# Patient Record
Sex: FEMALE | Race: WHITE | Employment: FULL TIME | URBAN - METROPOLITAN AREA
[De-identification: names, ages, dates, MRNs, and addresses within clinical notes are randomized per-mention and may not be internally consistent; named-entity substitution may affect disease eponyms.]

---

## 2019-02-19 NOTE — PERIOP NOTES
[address]                  
[address] [facility], [address] [facility] MAIN OR                                  [phone] MAIN PRE OP                          [phone]                                                                                AMBULATORY PRE OP          [phone] PRE-ADMISSION TESTING    21  Surgery Date: Wednesday, 2/27/19. Is surgery arrival time given by surgeon? NO [facility] will call you between 3 and 7pm the day before your surgery with your arrival time. (If your surgery is on a Monday, we will call you the Friday before.) Call [phone] after 7pm Monday-Friday if you did not receive your arrival time. Verification phone call on Tuesday, 2/26/19. INSTRUCTIONS BEFORE YOUR SURGERY When You 
Arrive Arrive at the 2nd [address] [facility] on the day of your surgery Have your insurance card, photo ID, and any copayment (if needed) Food 
 and  
Drink NO food or drink after midnight the night before surgery This means NO water, gum, mints, coffee, juice, etc. 
No alcohol (beer, wine, liquor) 24 hours before and after surgery Medications to TAKE Morning of Surgery MEDICATIONS TO TAKE THE MORNING OF SURGERY WITH A SIP OF WATER:  
? May Take allergy or Tylenol if needed morning of surgery with small sip of water. Medications To 
STOP      7 days before surgery ? Non-Steroidal anti-inflammatory Drugs (NSAID's): for example, Ibuprofen (Advil, Motrin), Naproxen (Aleve) ? Aspirin, if taking for pain ? Herbal supplements, vitamins, and fish oil 
? Other: 
(Pain medications not listed above, including Tylenol may be taken) Blood Thinners ? If you take  Aspirin, Plavix, Coumadin, or any blood-thinning or anti-blood clot medicine, talk to the doctor who prescribed the medications for pre-operative instructions. Bathing Clothing Jewelry Valuables ?  If you shower the morning of surgery, please do not apply anything to your skin (lotions, powders, deodorant, or makeup, especially mascara) ? Follow all special bath instructions (for total joint replacement, spine and bowel surgeries) ? Do not shave or trim anywhere 24 hours before surgery ? Wear your hair loose or down; no pony-tails, buns, or metal hair clips ? Wear loose, comfortable, clean clothes ? Wear glasses instead of contacts ? Leave money, valuables, and jewelry, including body piercings, at home Going Home       or Spending the Night ? SAME-DAY SURGERY: You must have a responsible adult drive you home and stay with you 24 hours after surgery ? ADMITS: If your doctor is keeping you into the hospital after surgery, leave personal belongings/luggage in your car until you have a hospital room number. Hospital discharge time is 12 noon Drivers must be here before 12 noon unless you are told differently Special Instructions Free  parking with No Tipping. Follow all instructions so your surgery wont be cancelled. Please, be on time. If a situation occurs and you are delayed the day of surgery, call [phone] or          [phone]. If your physical condition changes (like a fever, cold, flu, etc.) call your surgeon. The patient was contacted  via phone. Home medication reviewed and verified during PAT appointment. The patient verbalizes understanding of all instructions and does not  need reinforcement.

## 2019-02-26 ENCOUNTER — ANESTHESIA EVENT (OUTPATIENT)
Dept: SURGERY | Age: 24
End: 2019-02-26
Payer: SELF-PAY

## 2019-02-27 ENCOUNTER — HOSPITAL ENCOUNTER (OUTPATIENT)
Age: 24
Setting detail: OUTPATIENT SURGERY
Discharge: HOME | End: 2019-02-27
Attending: OTOLARYNGOLOGY | Admitting: OTOLARYNGOLOGY
Payer: SELF-PAY

## 2019-02-27 ENCOUNTER — ANESTHESIA (OUTPATIENT)
Dept: SURGERY | Age: 24
End: 2019-02-27
Payer: SELF-PAY

## 2019-02-27 VITALS
WEIGHT: 104.28 LBS | OXYGEN SATURATION: 100 % | SYSTOLIC BLOOD PRESSURE: 137 MMHG | BODY MASS INDEX: 17.8 KG/M2 | TEMPERATURE: 98.8 F | HEART RATE: 98 BPM | DIASTOLIC BLOOD PRESSURE: 87 MMHG | RESPIRATION RATE: 9 BRPM | HEIGHT: 64 IN

## 2019-02-27 DIAGNOSIS — Z98.890 H/O COSMETIC SURGERY: Primary | ICD-10-CM

## 2019-02-27 LAB — HCG UR QL: NEGATIVE

## 2019-02-27 PROCEDURE — 74011250636 HC RX REV CODE- 250/636: Performed by: OTOLARYNGOLOGY

## 2019-02-27 PROCEDURE — 77030002966 HC SUT PDS J&J -A: Performed by: OTOLARYNGOLOGY

## 2019-02-27 PROCEDURE — 74011000250 HC RX REV CODE- 250: Performed by: OTOLARYNGOLOGY

## 2019-02-27 PROCEDURE — 77030026438 HC STYL ET INTUB CARD -A: Performed by: NURSE ANESTHETIST, CERTIFIED REGISTERED

## 2019-02-27 PROCEDURE — 77030020782 HC GWN BAIR PAWS FLX 3M -B

## 2019-02-27 PROCEDURE — 74011250636 HC RX REV CODE- 250/636: Performed by: ANESTHESIOLOGY

## 2019-02-27 PROCEDURE — 77030008684 HC TU ET CUF COVD -B: Performed by: NURSE ANESTHETIST, CERTIFIED REGISTERED

## 2019-02-27 PROCEDURE — 76010000132 HC OR TIME 2.5 TO 3 HR: Performed by: OTOLARYNGOLOGY

## 2019-02-27 PROCEDURE — 77030002974 HC SUT PLN J&J -A: Performed by: OTOLARYNGOLOGY

## 2019-02-27 PROCEDURE — 74011250637 HC RX REV CODE- 250/637: Performed by: OTOLARYNGOLOGY

## 2019-02-27 PROCEDURE — 74011250636 HC RX REV CODE- 250/636

## 2019-02-27 PROCEDURE — 77030020256 HC SOL INJ NACL 0.9%  500ML: Performed by: OTOLARYNGOLOGY

## 2019-02-27 PROCEDURE — 74011000250 HC RX REV CODE- 250

## 2019-02-27 PROCEDURE — 77030021668 HC NEB PREFIL KT VYRM -A

## 2019-02-27 PROCEDURE — 77030019908 HC STETH ESOPH SIMS -A: Performed by: NURSE ANESTHETIST, CERTIFIED REGISTERED

## 2019-02-27 PROCEDURE — 76210000020 HC REC RM PH II FIRST 0.5 HR: Performed by: OTOLARYNGOLOGY

## 2019-02-27 PROCEDURE — 77030008771 HC TU NG SALEM SUMP -A: Performed by: NURSE ANESTHETIST, CERTIFIED REGISTERED

## 2019-02-27 PROCEDURE — 81025 URINE PREGNANCY TEST: CPT

## 2019-02-27 PROCEDURE — 76210000016 HC OR PH I REC 1 TO 1.5 HR: Performed by: OTOLARYNGOLOGY

## 2019-02-27 PROCEDURE — 76060000036 HC ANESTHESIA 2.5 TO 3 HR: Performed by: OTOLARYNGOLOGY

## 2019-02-27 PROCEDURE — 77030013079 HC BLNKT BAIR HGGR 3M -A: Performed by: NURSE ANESTHETIST, CERTIFIED REGISTERED

## 2019-02-27 PROCEDURE — 77030032490 HC SLV COMPR SCD KNE COVD -B: Performed by: OTOLARYNGOLOGY

## 2019-02-27 RX ORDER — ROCURONIUM BROMIDE 10 MG/ML
INJECTION, SOLUTION INTRAVENOUS AS NEEDED
Status: DISCONTINUED | OUTPATIENT
Start: 2019-02-27 | End: 2019-02-27 | Stop reason: HOSPADM

## 2019-02-27 RX ORDER — SODIUM CHLORIDE, SODIUM LACTATE, POTASSIUM CHLORIDE, CALCIUM CHLORIDE 600; 310; 30; 20 MG/100ML; MG/100ML; MG/100ML; MG/100ML
125 INJECTION, SOLUTION INTRAVENOUS CONTINUOUS
Status: DISCONTINUED | OUTPATIENT
Start: 2019-02-27 | End: 2019-02-27 | Stop reason: HOSPADM

## 2019-02-27 RX ORDER — CEFDINIR 300 MG/1
600 CAPSULE ORAL DAILY
Qty: 14 CAP | Refills: 0 | Status: SHIPPED | OUTPATIENT
Start: 2019-02-27 | End: 2019-03-06

## 2019-02-27 RX ORDER — SODIUM CHLORIDE 0.9 % (FLUSH) 0.9 %
5-40 SYRINGE (ML) INJECTION AS NEEDED
Status: DISCONTINUED | OUTPATIENT
Start: 2019-02-27 | End: 2019-02-27 | Stop reason: HOSPADM

## 2019-02-27 RX ORDER — DIPHENHYDRAMINE HYDROCHLORIDE 50 MG/ML
12.5 INJECTION, SOLUTION INTRAMUSCULAR; INTRAVENOUS AS NEEDED
Status: DISCONTINUED | OUTPATIENT
Start: 2019-02-27 | End: 2019-02-27 | Stop reason: HOSPADM

## 2019-02-27 RX ORDER — CEFAZOLIN SODIUM/WATER 2 G/20 ML
2 SYRINGE (ML) INTRAVENOUS
Status: DISCONTINUED | OUTPATIENT
Start: 2019-02-27 | End: 2019-02-27 | Stop reason: HOSPADM

## 2019-02-27 RX ORDER — DEXAMETHASONE SODIUM PHOSPHATE 10 MG/ML
10 INJECTION INTRAMUSCULAR; INTRAVENOUS
Status: CANCELLED | OUTPATIENT
Start: 2019-02-27 | End: 2019-02-28

## 2019-02-27 RX ORDER — SUCCINYLCHOLINE CHLORIDE 20 MG/ML
INJECTION INTRAMUSCULAR; INTRAVENOUS AS NEEDED
Status: DISCONTINUED | OUTPATIENT
Start: 2019-02-27 | End: 2019-02-27 | Stop reason: HOSPADM

## 2019-02-27 RX ORDER — MORPHINE SULFATE 4 MG/ML
2 INJECTION INTRAVENOUS
Status: DISCONTINUED | OUTPATIENT
Start: 2019-02-27 | End: 2019-02-27 | Stop reason: HOSPADM

## 2019-02-27 RX ORDER — OXYMETAZOLINE HCL 0.05 %
2 SPRAY, NON-AEROSOL (ML) NASAL
Status: DISCONTINUED | OUTPATIENT
Start: 2019-02-27 | End: 2019-02-27 | Stop reason: HOSPADM

## 2019-02-27 RX ORDER — CEFAZOLIN SODIUM 1 G/3ML
INJECTION, POWDER, FOR SOLUTION INTRAMUSCULAR; INTRAVENOUS AS NEEDED
Status: DISCONTINUED | OUTPATIENT
Start: 2019-02-27 | End: 2019-02-27 | Stop reason: HOSPADM

## 2019-02-27 RX ORDER — FLUMAZENIL 0.1 MG/ML
0.2 INJECTION INTRAVENOUS
Status: DISCONTINUED | OUTPATIENT
Start: 2019-02-27 | End: 2019-02-27 | Stop reason: HOSPADM

## 2019-02-27 RX ORDER — MIDAZOLAM HYDROCHLORIDE 1 MG/ML
INJECTION, SOLUTION INTRAMUSCULAR; INTRAVENOUS AS NEEDED
Status: DISCONTINUED | OUTPATIENT
Start: 2019-02-27 | End: 2019-02-27 | Stop reason: HOSPADM

## 2019-02-27 RX ORDER — PROPOFOL 10 MG/ML
INJECTION, EMULSION INTRAVENOUS AS NEEDED
Status: DISCONTINUED | OUTPATIENT
Start: 2019-02-27 | End: 2019-02-27 | Stop reason: HOSPADM

## 2019-02-27 RX ORDER — OXYMETAZOLINE HCL 0.05 %
SPRAY, NON-AEROSOL (ML) NASAL AS NEEDED
Status: DISCONTINUED | OUTPATIENT
Start: 2019-02-27 | End: 2019-02-27 | Stop reason: HOSPADM

## 2019-02-27 RX ORDER — SODIUM CHLORIDE 0.9 % (FLUSH) 0.9 %
5-40 SYRINGE (ML) INJECTION EVERY 8 HOURS
Status: DISCONTINUED | OUTPATIENT
Start: 2019-02-27 | End: 2019-02-27 | Stop reason: HOSPADM

## 2019-02-27 RX ORDER — LIDOCAINE HYDROCHLORIDE AND EPINEPHRINE 10; 10 MG/ML; UG/ML
INJECTION, SOLUTION INFILTRATION; PERINEURAL AS NEEDED
Status: DISCONTINUED | OUTPATIENT
Start: 2019-02-27 | End: 2019-02-27 | Stop reason: HOSPADM

## 2019-02-27 RX ORDER — LIDOCAINE HYDROCHLORIDE 20 MG/ML
INJECTION, SOLUTION EPIDURAL; INFILTRATION; INTRACAUDAL; PERINEURAL AS NEEDED
Status: DISCONTINUED | OUTPATIENT
Start: 2019-02-27 | End: 2019-02-27 | Stop reason: HOSPADM

## 2019-02-27 RX ORDER — SCOLOPAMINE TRANSDERMAL SYSTEM 1 MG/1
1 PATCH, EXTENDED RELEASE TRANSDERMAL
Status: DISCONTINUED | OUTPATIENT
Start: 2019-02-27 | End: 2019-02-27 | Stop reason: HOSPADM

## 2019-02-27 RX ORDER — DEXAMETHASONE SODIUM PHOSPHATE 4 MG/ML
INJECTION, SOLUTION INTRA-ARTICULAR; INTRALESIONAL; INTRAMUSCULAR; INTRAVENOUS; SOFT TISSUE AS NEEDED
Status: DISCONTINUED | OUTPATIENT
Start: 2019-02-27 | End: 2019-02-27 | Stop reason: HOSPADM

## 2019-02-27 RX ORDER — ONDANSETRON 4 MG/1
4 TABLET, ORALLY DISINTEGRATING ORAL
Qty: 20 TAB | Refills: 2 | Status: SHIPPED | OUTPATIENT
Start: 2019-02-27

## 2019-02-27 RX ORDER — BACITRACIN ZINC 500 UNIT/G
OINTMENT (GRAM) TOPICAL
Qty: 15 G | Refills: 0 | Status: SHIPPED | OUTPATIENT
Start: 2019-02-27

## 2019-02-27 RX ORDER — HYDROMORPHONE HYDROCHLORIDE 2 MG/ML
.25-1 INJECTION, SOLUTION INTRAMUSCULAR; INTRAVENOUS; SUBCUTANEOUS
Status: DISCONTINUED | OUTPATIENT
Start: 2019-02-27 | End: 2019-02-27 | Stop reason: HOSPADM

## 2019-02-27 RX ORDER — LIDOCAINE HYDROCHLORIDE 10 MG/ML
0.1 INJECTION, SOLUTION EPIDURAL; INFILTRATION; INTRACAUDAL; PERINEURAL AS NEEDED
Status: DISCONTINUED | OUTPATIENT
Start: 2019-02-27 | End: 2019-02-27 | Stop reason: HOSPADM

## 2019-02-27 RX ORDER — NALOXONE HYDROCHLORIDE 0.4 MG/ML
0.2 INJECTION, SOLUTION INTRAMUSCULAR; INTRAVENOUS; SUBCUTANEOUS
Status: DISCONTINUED | OUTPATIENT
Start: 2019-02-27 | End: 2019-02-27 | Stop reason: HOSPADM

## 2019-02-27 RX ORDER — HYDROCODONE BITARTRATE AND ACETAMINOPHEN 5; 325 MG/1; MG/1
1 TABLET ORAL
Qty: 25 TAB | Refills: 0 | Status: SHIPPED | OUTPATIENT
Start: 2019-02-27 | End: 2019-03-04

## 2019-02-27 RX ORDER — FENTANYL CITRATE 50 UG/ML
INJECTION, SOLUTION INTRAMUSCULAR; INTRAVENOUS AS NEEDED
Status: DISCONTINUED | OUTPATIENT
Start: 2019-02-27 | End: 2019-02-27 | Stop reason: HOSPADM

## 2019-02-27 RX ORDER — ONDANSETRON 2 MG/ML
INJECTION INTRAMUSCULAR; INTRAVENOUS AS NEEDED
Status: DISCONTINUED | OUTPATIENT
Start: 2019-02-27 | End: 2019-02-27 | Stop reason: HOSPADM

## 2019-02-27 RX ADMIN — ONDANSETRON 4 MG: 2 INJECTION INTRAMUSCULAR; INTRAVENOUS at 09:46

## 2019-02-27 RX ADMIN — Medication 2 SPRAY: at 07:15

## 2019-02-27 RX ADMIN — DEXAMETHASONE SODIUM PHOSPHATE 4 MG: 4 INJECTION, SOLUTION INTRA-ARTICULAR; INTRALESIONAL; INTRAMUSCULAR; INTRAVENOUS; SOFT TISSUE at 07:47

## 2019-02-27 RX ADMIN — SUCCINYLCHOLINE CHLORIDE 100 MG: 20 INJECTION INTRAMUSCULAR; INTRAVENOUS at 07:37

## 2019-02-27 RX ADMIN — HYDROMORPHONE HYDROCHLORIDE 0.5 MG: 2 INJECTION INTRAMUSCULAR; INTRAVENOUS; SUBCUTANEOUS at 10:34

## 2019-02-27 RX ADMIN — LIDOCAINE HYDROCHLORIDE 50 MG: 20 INJECTION, SOLUTION EPIDURAL; INFILTRATION; INTRACAUDAL; PERINEURAL at 07:36

## 2019-02-27 RX ADMIN — MIDAZOLAM HYDROCHLORIDE 2 MG: 1 INJECTION, SOLUTION INTRAMUSCULAR; INTRAVENOUS at 07:27

## 2019-02-27 RX ADMIN — ROCURONIUM BROMIDE 20 MG: 10 INJECTION, SOLUTION INTRAVENOUS at 07:44

## 2019-02-27 RX ADMIN — HYDROMORPHONE HYDROCHLORIDE 0.5 MG: 2 INJECTION INTRAMUSCULAR; INTRAVENOUS; SUBCUTANEOUS at 10:52

## 2019-02-27 RX ADMIN — FENTANYL CITRATE 50 MCG: 50 INJECTION, SOLUTION INTRAMUSCULAR; INTRAVENOUS at 07:27

## 2019-02-27 RX ADMIN — FENTANYL CITRATE 25 MCG: 50 INJECTION, SOLUTION INTRAMUSCULAR; INTRAVENOUS at 07:53

## 2019-02-27 RX ADMIN — SODIUM CHLORIDE, SODIUM LACTATE, POTASSIUM CHLORIDE, AND CALCIUM CHLORIDE 125 ML/HR: 600; 310; 30; 20 INJECTION, SOLUTION INTRAVENOUS at 06:33

## 2019-02-27 RX ADMIN — PROPOFOL 150 MG: 10 INJECTION, EMULSION INTRAVENOUS at 07:36

## 2019-02-27 RX ADMIN — PROPOFOL 50 MG: 10 INJECTION, EMULSION INTRAVENOUS at 07:38

## 2019-02-27 RX ADMIN — CEFAZOLIN SODIUM 2 G: 1 INJECTION, POWDER, FOR SOLUTION INTRAMUSCULAR; INTRAVENOUS at 07:45

## 2019-02-27 RX ADMIN — ROCURONIUM BROMIDE 5 MG: 10 INJECTION, SOLUTION INTRAVENOUS at 07:36

## 2019-02-27 RX ADMIN — FENTANYL CITRATE 75 MCG: 50 INJECTION, SOLUTION INTRAMUSCULAR; INTRAVENOUS at 07:36

## 2019-02-27 NOTE — H&P
The history and physical was reviewed by me and updated today. There are no changes from the previous history and physical (note addition below). This file should be an external document in the notes section or could be in the media portion of the chart. Chest CTA Heart RRR Sarah Sánchez MD

## 2019-02-27 NOTE — DISCHARGE INSTRUCTIONS
Patient Discharge Instructions    Meg Sanchez / [id number] : 1995    Admitted 2019 Discharged: 2019            Patient Discharge Instructions - Rhinoplasty     The following instructions have been designed to answer practically every question that might arise regarding the \"do's\" and \"don'ts\" after surgery. You and your family should read these several times to become familiar with them. Follow them faithfully, because those who do generally have the smoothest postoperative course. SWELLING  Every operation, no matter how minor, is accompanied by swelling of the surrounding tissues. The amount varies from person to person. The swelling itself is not serious and is to be expected after your surgery. It sometimes is worse on the second postoperative day than it was on the first, and in the mornings. Remember is that swelling will always subside eventually. You can help decrease the swelling in the following ways:    1. Sleep with your head elevated until all of the dressings have been removed from the nose. Use an additional pillow or two under the mattress, if necessary. 2.    Stay up (sitting, standing, walking about) as much as possible after your return home. THIS IS IMPORTANT. Of course, you should rest when you tire. 3.    Avoid bending over or lifting heavy things for one week. Besides aggravating the swelling, this may raise your blood pressure and start a hemorrhage. 4.    Avoid hitting or bumping your new nose. It is wise not to  small children. 5.    Avoid excessive sunning of the face during the first month after your operation. A sunscreen is always advisable, but a total sunblock is suggested for the first month. 6.    Do not tweeze your eyebrows for one week. 7.    Avoid \"sniffing\", that is, constantly attempting to pull air through the nose as some people do when their nose feels blocked.   This will not relieve the sensation of blockage - it will only aggravate it because the suction created on the inside will cause more swelling. 8.    Avoid rubbing the nostrils and the base of the nose with Kleenex or a handkerchief. Not only will this aggravate the swelling, but also it may cause infection, bleeding, or the accumulation of scar tissue inside the nose. Use the \"moustache\" gauze dressing instead if discharge is excessive. DISCOLORATION  It is not unusual to have varying amounts of discoloration about the face. Like swelling, the discoloration may become more pronounced after you have been discharged. It usually lasts not more than a week or two, all the while decreasing in intensity. If the nasal bones were not reshaped, there is usually very little bruising. The measures which will help your swelling subside will also be working to decrease the amount of discoloration. You can camouflage the discoloration to some extent by using a thick makeup base. NUMBNESS  After surgery you will notice that the tip of your nose feels firm, and it is not uncommon for the nose to feel numb for a short time. If you have incisions inside your nose, you may be able to feel minor irregularities in its surface until all swelling disappears. NASAL PACKING AND BLEEDING  It is not uncommon to soak several gauze pads (your moustache dressing) during the first several hours after surgery. The frequency with which these are changed should decrease. If it does not, go to bed, apply ice compresses about the face, and report it to the office by telephone. You may be told to return to the office or hospital.    Change the drip pad as needed using 4x4 gauze and tape. If the gauze becomes stuck to your nose, you can soften it with the nasal saline drops.       Whenever the nasal passages are blocked, such as when you have a cold or an allergy, the nasal glands produce more mucous than normal.  Your nose is blocked from the postoperative swelling, so you can expect an increase in mucous drainage for several days. It will be blood tinged and should cause you no concern unless the drainage looks like pure blood and flows freely, as when you cut a finger. If this happens, please call the office immediately. DO NOT attempt to remove blood clots or anything else from the nostrils. If a turbinate resection was part of your nasal procedure, bleeding can occur from this area for up to six weeks after your surgery. Be diligent in using the nasal drops and ointment. This helps the healing process and the dissolving of the crusts that form on the turbinates. Your exercise regimen will be curtailed at least to some extent for the first few weeks following surgery. Upper body exercise is especially prohibited, as it is more likely to cause turbinate bleeding. PAIN  There is usually little actual pain following nasal surgery, but you may experience a deep bruised sensation as a result of the postoperative swelling that occurs. As is usually the case with such things, this seems worse at night and when one becomes nervous. The usually prescribed drugs which minimize pain often cause sensations of light-headedness, particularly in the immediate postoperative period. This may seem to make your recovery more tedious. Please take the pain medicine as needed. Do not try to \"tough it out\" if you are uncomfortable. DO NOT take aspirin, ibuprofen, or any other NSAIDs (Non-Steroidal Anti-Inflammatory Drugs). NAUSEA  Sometimes the anesthesia, the pain pills, or swallowed blood will make you nauseated. You will receive a prescription for anti-nausea medication to use if needed. DEPRESSION  It is not unusual for an individual to go through a period of mild depression twelve to thirty-six hours after surgery.   Even though you very much want this surgery, and even though we have tried to tell you what to expect postoperatively, you may be somewhat shocked at seeing your own face swollen and bruised. This is a very temporary condition which will subside shortly. The best \"treatment\" is to busy yourself with the details of your postoperative care and try to remember that the recovery period will soon be over. INSOMNIA  You may experience some difficulty falling asleep. For this we have prescribed a sleeping pill. If you must take one, remember that such drugs make some people feel light-headed and weak. KEEPING A STIFF UPPER LIP  The upper lip is important in nasal surgery, as much work is done in this area. To keep the healing tissues from being disturbed, do not move your upper lip for as long as the bandage is in place. Avoid pursing the lips such as in kissing for ten days. Do not pull the upper lip down as women do when applying lipstick. Apply lipstick with a brush. Be careful not to manipulate the upper lip excessively when brushing your teeth. Avoid gum or foods that are hard to chew. Soups, mashed potatoes, stewed chicken, hamburger steak, or any easily chewed food is permissible. CLEANING THE NOSE  Don't blow the nose at all for ten days. After that, blow through both sides at once. Do not compress one side. The outside and near inside of the nostrils may need to be cleaned with a Q-tip moistened with warm water or hydrogen peroxide if crusting is present. The antibiotic ointment that you will be prescribed, usually Bacitracin, should be applied to the inside of the nose with a Q-tip 3 to 4 times a day. Twist the Q-tip around inside gently; you can go in about an inch or until you feel any resistance. This will help prevent crusting and help you to breathe better. You were also prescribed a saline spray. Apply a few sprays on each side every few hours while awake. DRYNESS OF THE LIPS  If your lips become dry from breathing through your mouth, coat them with Vaseline or lipstick.   A vaporizer with plain water by the bedside at night might be a helpful addition. TEMPERATURE  Generally, the body temperature does not rise much above 100 degrees following nasal surgery. This rise usually occurs if the patient becomes slightly dehydrated. Be sure to drink plenty of fluid after surgery. Report any persistent temperature above 100 degrees. WEAKNESS  It is not unusual for a person who has had an anesthetic or any type of operation to feel weak, have palpitations, break out in \"cold sweats\", or get dizzy. This gradually clears up in a few days without medication. MEDICATION  Almost all patients will be prescribed an antibiotic to be taken after surgery. Obtain explicit instructions about this medicine from the nurse. Multivitamins with vitamin C are suggested for the pre- and postoperative periods, and can be obtained by you without a prescription. We strongly discourage you from taking any Vitamin E preparations prior to or after surgery. These may increase the probability of bleeding. If you develop a rash or other reaction while you are taking one of the medicines, this could mean that you are developing an allergy to the medicine. If this occurs, please stop taking your medications and call the office immediately. YOUR FIRST POSTOPERATIVE OFFICE VISIT  The appointment for your first postop visit is usually made prior to surgery. This appointment will likely be for the day after surgery. The nose will be examined and cleaned, and the post-operative instructions will be reviewed. It is important that you try to eat or drink something before coming into the office the day after surgery. Ideally, this should be high in calories and protein, such as milk and cookies. If you don't feel up to this, at least a soft drink with high sugar content is advisable. POSTOPERATIVE CARE  Following your surgery, we will want to see you in the office at regularly scheduled intervals to monitor your progress.     RESUMING ACTIVITIES  While the bandage is in place, don't wear any pull over clothing. AVOID STRENUOUS ATHLETIC ACTIVITY FOR ONE MONTH, including swimming, jogging, aerobics, etc.  No diving or water skiing for 2 months. No contact sports for 6 months. Avoid sneezing until the bandage is removed. If you must sneeze, let it come out like a cough - through the mouth. If it becomes a real problem, we will prescribe medication to alleviate the condition. Eyeglasses may be worn as long as the metal splint remains on the nose. After the splint is removed, glasses  must be suspended from the forehead for a period of about six weeks. If this is not done, the pressure of your glasses may change the contour of your nose. Your glasses can be suspended from the nose after your splint is removed in three ways. One way is to use a piece of tape to hold the glasses on your forehead so that the weight is off your nose. Contact lenses may be worn the day after surgery. RETURNING TO WORK OR SCHOOL  The average patient is able to return to school the day after the bandage is removed. That will be about five to six days after your surgery. Some hardy souls have returned earlier. Returning to work depends on several factors: the amount of physical activity involved in your position, the amount of public contact your job requires, and the amount of swelling and discoloration that you may develop. On the average, you may return to work on the 8th to 10th postoperative day. INJURY TO THE NOSE  Some individuals sustain accidents during the early postoperative period. You need not be too concerned unless the blow is hard enough to cause significant bleeding, swelling or pain. If a blow is sustained while the metal splint is still on, this should help protect the nose. However, for the first five weeks after the nasal splint is removed, more attention should be paid to any injury to the nose.   Blows to the nose can cause the nasal bones to become deviated. Please report any accident to the office immediately if you feel it was a significant bump. Otherwise, let us know about it at your next visit. FINALLY  Remember the things you were told before your operation:    When the bandage is first removed, your nose may appear fat and turned up too much. This is caused by the operative swelling over the nose and in the upper lip. The swelling will subside to a great extent during the next week. However, remember that it will take up to one or two years for all the swelling to disappear and for your nose to reach its final contour. The discoloration will gradually disappear over a period of seven to ten days, in most cases. The thicker and oilier the skin, the longer it takes for the swelling to subside. The upper lip may seem stiff for some time after surgery, and you may feel that this interferes with your smile. Be patient. This will disappear within a few weeks. The tip of the nose sometimes feels numb after nasal surgery. This will eventually disappear. Occasionally, the upper teeth will have tingling if extensive septal work was necessary. This, too, will resolve with time. Follow-up with Lawanda Villalobos MD Thursday, 2/28/19 at 1:15pm    If you have any questions, please call us at [phone]. We are always happy to answer your questions, and if you should have a problem, this number is answered 24 hours a day. DISCHARGE SUMMARY from your Nurse    The following personal items collected during your admission are returned to you:   Dental Appliance: Dental Appliances: None  Vision: Visual Aid: Glasses, With patient  Hearing Aid:    Jewelry: Jewelry: None  Clothing: Clothing: (Street clothing to locker)  Other Valuables:  Other Valuables: Eyeglasses  Valuables sent to safe:      PATIENT INSTRUCTIONS:    After general anesthesia or intravenous sedation, for 24 hours or while taking prescription Narcotics:  · Limit your activities  · Do not drive and operate hazardous machinery  · Do not make important personal or business decisions  · Do  not drink alcoholic beverages  · If you have not urinated within 8 hours after discharge, please contact your surgeon on call. Report the following to your surgeon:  · Excessive pain, swelling, redness or odor of or around the surgical area  · Temperature over 100.5  · Nausea and vomiting lasting longer than 4 hours or if unable to take medications  · Any signs of decreased circulation or nerve impairment to extremity: change in color, persistent  numbness, tingling, coldness or increase pain  · Any questions    COUGH AND DEEP BREATHE    Breathing deep and coughing are very important exercises to do after surgery. Deep breathing and coughing open the little air tubes and air sacks in your lungs. You take deep breaths every day. You may not even notice - it is just something you do when you sigh or yawn. It is a natural exercise you do to keep these air passages open. After surgery, take deep breaths and cough, on purpose. Coughing and deep breathing help prevent bronchitis and pneumonia after surgery. If you had chest or belly surgery, use a pillow as a \"hug buddy\" and hold it tightly to your chest or belly when you cough. DIRECTIONS:  6. Take 10 to 15 slow deep breaths every hour while awake. 7. Breathe in deeply, and hold it for 2 seconds. 8. Exhale slowly through puckered lips, like blowing up a balloon. 9. After every 4th or 5th deep breath, hug your pillow to your chest or belly and give a hard, deep cough. Yes, it will probably hurt. But doing this exercise is very important part of healing after surgery. Take your pain medicine to help you do this exercise without too much pain. IF YOU HAVE BEEN DIAGNOSED WITH SLEEP APNEA, PLEASE USE YOUR SLEEP APNEA DEVICE OR CPAP MACHINE WHEN YOU INTEND TO NAP AFTER TAKING PAIN MEDICATION.     Ankle Pumps    Ankle pumps increase the circulation of oxygenated blood to your lower extremities and decrease your risk for circulation problems such as blood clots. They also stretch the muscles, tendons and ligaments in your foot and ankle, and prevent joint contracture in the ankle and foot, especially after surgeries on the legs. It is important to do ankle pump exercises regularly after surgery because immobility increases your risk for developing a blood clot. Your doctor may also have you take an Aspirin for the next few days as well. If your doctor did not ask you to take an Aspirin, consult with him before starting Aspirin therapy on your own. Slowly point your foot forward, feeling the muscles on the top of your lower leg stretch, and hold this position for 5 seconds. Next, pull your foot back toward you as far as possible, stretching the calf muscles, and hold that position for 5 seconds. Repeat with the other foot. Perform 10 repetitions every hour while awake for both ankles if possible (down and then up with the foot once is one repetition). You should feel gentle stretching of the muscles in your lower leg when doing this exercise. If you feel pain, or your range of motion is limited, don't  Push too hard. Only go the limit your joint and muscles will let you go. If you have increasing pain, progressively worsening leg warmth or swelling, STOP the exercise and call your doctor. Below is information about the medications your doctor is prescribing after your visit:    Other information in your discharge envelope:  []     PRESCRIPTIONS  []     PHYSICAL THERAPY PRESCRIPTION  []     APPOINTMENT CARDS  []     Regional Anesthesia Pamphlet for block or block with On-Q Catheter from Anesthesia Service  []     Medical device information sheets/pamphlets from their    []     School/work excuse note. []     /parent work excuse note.       These are general instructions for a healthy lifestyle:    *  Please give a list of your current medications to your Primary Care Provider. *  Please update this list whenever your medications are discontinued, doses are      changed, or new medications (including over-the-counter products) are added. *  Please carry medication information at all times in case of emergency situations. About Smoking  No smoking / No tobacco products / Avoid exposure to second hand smoke    Surgeon General's Warning:  Quitting smoking now greatly reduces serious risk to your health. Obesity, smoking, and sedentary lifestyle greatly increases your risk for illness and disease. A healthy diet, regular physical exercise & weight monitoring are important for maintaining a healthy lifestyle. Congestive Heart Failure  You may be retaining fluid if you have a history of heart failure or if you experience any of the following symptoms:  Weight gain of 3 pounds or more overnight or 5 pounds in a week, increased swelling in our hands or feet or shortness of breath while lying flat in bed. Please call your doctor as soon as you notice any of these symptoms; do not wait until your next office visit. Recognize signs and symptoms of STROKE:  F - face looks uneven  A - arms unable to move or move even  S - speech slurred or non-existent  T - time-call 911 as soon as signs and symptoms begin-DO NOT go         Back to bed or wait to see if you get better-TIME IS BRAIN. Warning signs of HEART ATTACK  Call 911 if you have these symptoms    · Chest discomfort. Most heart attacks involve discomfort in the center of the chest that lasts more than a few minutes, or that goes away and comes back. It can feel like uncomfortable pressure, squeezing, fullness, or pain. · Discomfort in other areas of the upper body. Symptoms can include pain or discomfort in one or both        Arms, the back, neck, jaw, or stomach.   ·  Shortness of breath with or without chest discomfort. · Other signs may include breaking out in a cold sweat, nausea, or lightheadedness    Don't wait more than five minutes to call 911 - MINUTES MATTER! Fast action can save your life. Calling 911 is almost always the fastest way to get lifesaving treatment. Emergency Medical Services staff can begin treatment when they arrive - up to an hour sooner than if someone gets to the hospital by car. SHAWANDA WOODSON MEDICATION AND SIDE EFFECT GUIDE    The [facility] MEDICATION AND SIDE EFFECT GUIDE was provided to the PATIENT AND CARE PROVIDER.   Information provided includes instruction about drug purpose and common side effects for the following medications:                                                              Norco                                      Bacitracin                                      Zofran                                      Omnicef                                      Sterile Saline

## 2019-02-27 NOTE — ANESTHESIA POSTPROCEDURE EVALUATION
Procedure(s): COSMETIC RHINOPLASTY. Anesthesia Post Evaluation Patient location during evaluation: bedside Level of consciousness: awake Pain management: satisfactory to patient Airway patency: patent Anesthetic complications: no 
Cardiovascular status: acceptable Respiratory status: acceptable Hydration status: acceptable Visit Vitals /87 Pulse 98 Temp 37.1 °C (98.8 °F) Resp 9 Ht 5' 4\" (1.626 m) Wt 47.3 kg (104 lb 4.4 oz) SpO2 100% BMI 17.90 kg/m²

## 2019-02-27 NOTE — PERIOP NOTES
Nasal pledgets removed on bilateral nostrils. 3 pledgets removed on both sides. Patient tolerated well.

## 2019-02-27 NOTE — BRIEF OP NOTE
BRIEF OPERATIVE NOTE Date of Procedure: 2/27/2019 Preoperative Diagnosis: COSMETIC Postoperative Diagnosis: COSMETIC Procedure(s): COSMETIC RHINOPLASTY Surgeon(s) and Role: 
   * Mor Green MD - Primary Surgical Assistant: none Surgical Staff: 
Circ-1: Rosalino Doran RN 
Circ-Relief: Kae Miller RN 
Circ-Intern: Luis Manuel Moncada Scrub Tech-1: Blanka Lara Scrub Tech-2: [employer] Event Time In Time Out Incision Start 5218 Incision Close 4501 Anesthesia: General  
Estimated Blood Loss: min Specimens: * No specimens in log * Findings: cosmetic Complications: none Implants: * No implants in log *

## 2019-02-28 NOTE — OP NOTES
[facility] [facility]  OPERATIVE REPORT    Name:  Prashanth Yost  MR#:  [id number]  :  1995  ACCOUNT #:  [de-identified]  DATE OF SERVICE:  2019      PREOPERATIVE DIAGNOSIS:  Nasal disproportion. POSTOPERATIVE DIAGNOSIS:  Nasal disproportion. PROCEDURE PERFORMED:  Cosmetic endonasal rhinoplasty. SURGEON:  Billee Schwab, MD    ASSISTANT:  None    ANESTHESIA:  General.    COMPLICATIONS:  None. ESTIMATED BLOOD LOSS:  Minimal.    SPECIMENS:  None. IMPLANTS:  None. FINDINGS:  There was a moderate dorsal hump composed of cartilage and bone. There was some over projection of the tip with some blunting of the nasolabial angle from tension septum deformity. There was a prominent depressor septi muscle noted between the medial crura footplates under the septum. The septum was otherwise straight with the exception of a slight bony spur posterior to the right. The middle third of the nose was slightly narrow especially after hump takedown. This is worse on the left. INDICATIONS FOR THE PROCEDURE:  The patient is a 60-year-old female who does not the cosmetic appearance of her nose. She has no history of previous surgery or significant prior trauma. PROCEDURE IN DETAIL:  After informed consent, the patient was taken to the operating room and placed on the table in a supine position. After general anesthesia, the table was turned 180 degrees. The nose was packed with Afrin pledgets and injected with 1% lidocaine with 1:100,000 epinephrine. The patient was prepped and draped in the standard fashion. Initially, a left intercartilaginous incision was made and connected to a left hemitransfixion incision. A mucoperichondrial flap was elevated on the left side of the septum all the way past the bony cartilaginous junction into the floor of the nose.   On the right side, the septal mucosa was elevated for about 1.5 cm to expose the caudal septum through the intercartilaginous incision and then scissors were used to develop a bloodless plane over the dorsum of the cartilage exposing the dorsum completely all the way to the bony cartilaginous junction where the periosteal elevator was used to elevate the periosteum in the midline. The dorsal hump was first taken down using a rasp and then the cartilage was taken down incrementally along with the rasping until the dorsum was flat. This was extended all the way to the anterior septal angle in this case. The anteroseptal angle only approximately 1-2 mm was removed. The caudal septum was shortened mostly posteriorly at the posterior septal angle because it was prominent and extended past the anterior nasal spine. This caused some of the blunting of the nasolabial angle. This was also done to completely detach any attachment of the medial crura footplates and the depressor septi from the septum also between the medial crural footplates and then scissors were used to dissect some of the depressor septi muscle at the anterior nasal spine region. This area was cauterized as well for minor oozing and also to attenuate the muscle. After the dorsal hump was then taken down completely, lateral osteotomies were performed in the high-low-high fashion to close the open roof deformity and this was done through a stab incision at the piriform aperture.  graft tunnels were made with a caudal elevator extending up between the upper and lateral cartilages of the septum. The mucosa was maintained its attachment on the right side. On the left side, there was communication through the dorsal resection. Cartilage was then harvested from the septum leaving about a 1.5 cm caudal and dorsal strut. The posterior spur was taken down with heavy scissors. The cartilage that was harvested was cut in  grafts and a slightly thicker graft was placed in the left. This was placed into the pocket on the right side.   It was tucked under the nasal bone to make sure it did not protrude past the dorsum. There was a pocket in this location anteriorly. Because the pocket was communicating, it was sewn to the septum using a mattress 5-0 PDS suture. The upper lateral cartilages were evaluated and trimmed slightly for slight redundancy and then at the anterior septal angle, a mattress suture was placed to close the upper lateral cartilages over the  grafts. The dorsum was flattened out with a much improved profile. The lateral osteotomy sites were closed with 5-0 plain sutures. The dorsal pocket was irrigated with bacitracin saline and then the intercartilaginous incision was closed with 5-0 plain sutures. A thick graft incision was then made which was a marginal incision on the right side extending on to just the upper few millimeters of the columella and extending under the dome. Scissors were used to dissect the pocket into the most anterior part of the tip of the most projecting portion and into the cephalic region. The infratip lobule was not dissected in this case. The harvested cartilage was then cut into two cap type grafts which were crushed in a slightly triangular fashion. They were about 9-10 mm wide and then the more superficial one was a few millimeters more narrow. These were placed on top of each  other with fat graft and then placed into the pocket adjusting the graft placement so that it was moderately cephalic. This was done to increase the tip projection and improve the supratip break. This was done by assuming some loss of tip projection would be seen beyond what was seen on the table from the various maneuvers. The thick graft incision was then closed with 5-0 plain sutures. All the remaining cartilages were crushed and placed back between the septal flap to reskeletonize the septum and this was done after irrigating the septum with bacitracin and saline.   The transfixion incision was closed with 5-0 plain sutures. The inferior turbinates were outfractured to prevent any damage while sewing the septum and then a mattress of 5-0 plain was placed to reappose the septal flaps incorporating the replaced cartilage. The nose was suctioned to the nasopharynx and the airway was wide open at this point. A 4-0 plain suture on the Soheila needle was used to close the dead space between the medial crura footplates. This also acted to stabilize the columellar position. Second septocolumellar suture was placed from the mid portion of the columella to the more superior part of the septum at the anteroseptal angle region to stabilize the projection. Neither of this was done to reduce columellar show and certainly, a small amount of dissection was performed between the medial crura more anteriorly prior to closing the septum. In this case, no real tongue-in-groove maneuver was performed because the projection was one of the goals of surgery. At this point, pledgets soaked in Afrin and coated with bacitracin was placed on both sides and the string was taped to the face. This concluded the procedure. The patient was then awakened from anesthesia, extubated and taken to the PACU in stable condition. There were no complications. The patient tolerated the procedure well.       MD SARAH Quintana/[email or web address]/  D:  02/27/2019 10:17  T:  02/27/2019 22:04  JOB #:  [id number]

## (undated) DEVICE — KENDALL SCD EXPRESS SLEEVES, KNEE LENGTH, MEDIUM: Brand: KENDALL SCD

## (undated) DEVICE — INFECTION CONTROL KIT SYS

## (undated) DEVICE — ROCKER SWITCH PENCIL BLADE ELECTRODE, HOLSTER: Brand: EDGE

## (undated) DEVICE — SUTURE PDS II SZ 5-0 L18IN ABSRB UD P-3 L13MM 3/8 CIR PRIM Z493G

## (undated) DEVICE — LIGHT HANDLE: Brand: DEVON

## (undated) DEVICE — SUT PLN 5-0 18IN P3 YEL --

## (undated) DEVICE — SYR IRR BLB 2OZ DISP BLU STRL -- CONVERT TO ITEM 357637

## (undated) DEVICE — INTENDED FOR TISSUE SEPARATION, AND OTHER PROCEDURES THAT REQUIRE A SHARP SURGICAL BLADE TO PUNCTURE OR CUT.: Brand: BARD-PARKER ® CARBON RIB-BACK BLADES

## (undated) DEVICE — BIPOLAR FORCEPS CORD: Brand: VALLEYLAB

## (undated) DEVICE — STERILE POLYISOPRENE POWDER-FREE SURGICAL GLOVES: Brand: PROTEXIS

## (undated) DEVICE — OCCLUSIVE GAUZE STRIP,3% BISMUTH TRIBROMOPHENATE IN PETROLATUM BLEND: Brand: XEROFORM

## (undated) DEVICE — SOLUTION IV 500ML 0.9% SOD CHL FLX CONT

## (undated) DEVICE — MASTISOL ADHESIVE LIQ 2/3ML

## (undated) DEVICE — APPLICATOR FBR TIP L6IN COT TIP WOOD SHFT SWAB 2000 PER CA

## (undated) DEVICE — DEVON™ KNEE AND BODY STRAP 60" X 3" (1.5 M X 7.6 CM): Brand: DEVON

## (undated) DEVICE — MAGNETIC INSTRUMENT PAD 10" X 16"; MEDIUM; DISPOSABLE: Brand: CARDINAL HEALTH

## (undated) DEVICE — PACK PROCEDURE SURG ENT CUST

## (undated) DEVICE — Z DISCONTINUEDSOLUTION PREP 2OZ 10% POVIDONE IOD SCR CAP BTL

## (undated) DEVICE — 3M™ MICROPORE™ TAPE ¸INCH X 10 YARDS, TAN, 24 ROLLS/CARTON, 10 CARTONS/CASE, 1533-0: Brand: 3M™ MICROPORE™

## (undated) DEVICE — TELFA NON-ADHERENT ABSORBENT DRESSING: Brand: TELFA

## (undated) DEVICE — ELECTRODE NDL L2CM TUNGSTEN MICSURG STR IMPROVED CUT AND

## (undated) DEVICE — SUTURE ABSORBABLE MONOFILAMENT 4-0 SC-1 18 IN PLN GUT 1824H